# Patient Record
Sex: MALE | Race: WHITE | NOT HISPANIC OR LATINO | URBAN - METROPOLITAN AREA
[De-identification: names, ages, dates, MRNs, and addresses within clinical notes are randomized per-mention and may not be internally consistent; named-entity substitution may affect disease eponyms.]

---

## 2023-07-31 ENCOUNTER — OFFICE VISIT (OUTPATIENT)
Dept: URGENT CARE | Facility: CLINIC | Age: 53
End: 2023-07-31
Payer: COMMERCIAL

## 2023-07-31 VITALS
WEIGHT: 230 LBS | SYSTOLIC BLOOD PRESSURE: 141 MMHG | OXYGEN SATURATION: 97 % | BODY MASS INDEX: 31.15 KG/M2 | RESPIRATION RATE: 16 BRPM | TEMPERATURE: 97.9 F | HEART RATE: 61 BPM | HEIGHT: 72 IN | DIASTOLIC BLOOD PRESSURE: 81 MMHG

## 2023-07-31 DIAGNOSIS — H00.011 HORDEOLUM EXTERNUM OF RIGHT UPPER EYELID: Primary | ICD-10-CM

## 2023-07-31 PROCEDURE — 99213 OFFICE O/P EST LOW 20 MIN: CPT | Performed by: PHYSICIAN ASSISTANT

## 2023-07-31 RX ORDER — ERYTHROMYCIN 5 MG/G
0.5 OINTMENT OPHTHALMIC
Qty: 3.5 G | Refills: 0 | Status: SHIPPED | OUTPATIENT
Start: 2023-07-31 | End: 2023-08-07

## 2023-07-31 RX ORDER — IBUPROFEN 600 MG/1
600 TABLET ORAL 2 TIMES DAILY
COMMUNITY

## 2023-07-31 NOTE — PROGRESS NOTES
[facility]        NAME: Gina Aquino is a 46 y.o. male  : 1970    MRN: [id number]  DATE: 2023  TIME: 9:24 AM    Assessment and Plan   Hordeolum externum of right upper eyelid [H00.011]  1. Hordeolum externum of right upper eyelid  erythromycin (ILOTYCIN) ophthalmic ointment            Patient Instructions   Warm compress. Ointments. Wash hands often. Do not touch face. Follow up with PCP in 3-5 days. Proceed to  ER if symptoms worsen. Chief Complaint     Chief Complaint   Patient presents with   • Eye Problem     Right eye problem; swollen eye lids, itchy and teary eyes, and discharge starting yesterday AM; denies any foreign body or changes to vision         History of Present Illness       Patient is a 46 no significant past medical history presents the office complaining of right eye swelling since yesterday. Reports some watery discharge. Reports faint amount of itching and pain. Denies vision changes, photophobia, foreign body sensation, or painful EOMs. Wears glasses but not contacts. Review of Systems   Review of Systems   Eyes: Positive for pain, discharge, redness and itching. Negative for photophobia and visual disturbance.          Current Medications       Current Outpatient Medications:   •  erythromycin (ILOTYCIN) ophthalmic ointment, Administer 0.5 inches to the right eye daily at bedtime for 7 days, Disp: 3.5 g, Rfl: 0  •  Fexofenadine-Pseudoephedrine (ALLEGRA-D 24 HOUR PO), Take by mouth, Disp: , Rfl:   •  Multiple Vitamins-Minerals (EMERGEN-C IMMUNE PO), Take by mouth, Disp: , Rfl:   •  ibuprofen (MOTRIN) 600 mg tablet, Take 600 mg by mouth 2 (two) times a day (Patient not taking: Reported on 2023), Disp: , Rfl:     Current Allergies     Allergies as of 2023 - Reviewed 2023   Allergen Reaction Noted   • Penicillins Anaphylaxis, Rash, and Shortness Of Breath 2021            The following portions of the patient's history were reviewed and updated as appropriate: allergies, current medications, past family history, past medical history, past social history, past surgical history and problem list.     Past Medical History:   Diagnosis Date   • Allergic        Past Surgical History:   Procedure Laterality Date   • BACK SURGERY  2018    fixed herniated disc   • CATARACT EXTRACTION  2020   • HERNIA REPAIR  2021   • KNEE SURGERY Left 2019       History reviewed. No pertinent family history. Medications have been verified. Objective   /81   Pulse 61   Temp 97.9 °F (36.6 °C)   Resp 16   Ht 6' (1.829 m)   Wt 104 kg (230 lb)   SpO2 97%   BMI 31.19 kg/m²   No LMP for male patient. Physical Exam     Physical Exam  Vitals and nursing note reviewed. Constitutional:       Appearance: He is well-developed. HENT:      Head: Normocephalic and atraumatic. Right Ear: External ear normal.      Left Ear: External ear normal.      Nose: Nose normal.   Eyes:      General: Lids are normal. Vision grossly intact. Right eye: Discharge and hordeolum (upper) present. No foreign body. Extraocular Movements: Extraocular movements intact. Conjunctiva/sclera: Conjunctivae normal.      Right eye: Right conjunctiva is not injected. Pupils: Pupils are equal, round, and reactive to light. Cardiovascular:      Rate and Rhythm: Normal rate and regular rhythm. Pulmonary:      Effort: Pulmonary effort is normal.      Breath sounds: Normal breath sounds. Skin:     General: Skin is warm and dry. Capillary Refill: Capillary refill takes less than 2 seconds. Neurological:      Mental Status: He is alert.